# Patient Record
Sex: MALE | Race: WHITE | NOT HISPANIC OR LATINO | URBAN - METROPOLITAN AREA
[De-identification: names, ages, dates, MRNs, and addresses within clinical notes are randomized per-mention and may not be internally consistent; named-entity substitution may affect disease eponyms.]

---

## 2023-12-03 DIAGNOSIS — G56.21 LESION OF RIGHT ULNAR NERVE: ICD-10-CM

## 2023-12-04 PROBLEM — G56.21 LESION OF RIGHT ULNAR NERVE: Status: ACTIVE | Noted: 2023-12-03

## 2023-12-14 ENCOUNTER — ANESTHESIA EVENT (OUTPATIENT)
Dept: OPERATING ROOM | Facility: CLINIC | Age: 48
End: 2023-12-14
Payer: COMMERCIAL

## 2023-12-15 ENCOUNTER — HOSPITAL ENCOUNTER (OUTPATIENT)
Facility: CLINIC | Age: 48
Setting detail: OUTPATIENT SURGERY
Discharge: HOME | End: 2023-12-15
Attending: ORTHOPAEDIC SURGERY | Admitting: ORTHOPAEDIC SURGERY
Payer: COMMERCIAL

## 2023-12-15 ENCOUNTER — ANESTHESIA (OUTPATIENT)
Dept: OPERATING ROOM | Facility: CLINIC | Age: 48
End: 2023-12-15
Payer: COMMERCIAL

## 2023-12-15 VITALS
HEART RATE: 78 BPM | SYSTOLIC BLOOD PRESSURE: 107 MMHG | RESPIRATION RATE: 16 BRPM | OXYGEN SATURATION: 95 % | BODY MASS INDEX: 36.57 KG/M2 | WEIGHT: 246.91 LBS | TEMPERATURE: 97.7 F | HEIGHT: 69 IN | DIASTOLIC BLOOD PRESSURE: 55 MMHG

## 2023-12-15 DIAGNOSIS — G56.21 LESION OF RIGHT ULNAR NERVE: Primary | ICD-10-CM

## 2023-12-15 PROCEDURE — 64718 REVISE ULNAR NERVE AT ELBOW: CPT | Performed by: ORTHOPAEDIC SURGERY

## 2023-12-15 PROCEDURE — 2720000007 HC OR 272 NO HCPCS: Performed by: ORTHOPAEDIC SURGERY

## 2023-12-15 PROCEDURE — 2500000005 HC RX 250 GENERAL PHARMACY W/O HCPCS: Performed by: ORTHOPAEDIC SURGERY

## 2023-12-15 PROCEDURE — 7100000010 HC PHASE TWO TIME - EACH INCREMENTAL 1 MINUTE: Performed by: ORTHOPAEDIC SURGERY

## 2023-12-15 PROCEDURE — 7100000001 HC RECOVERY ROOM TIME - INITIAL BASE CHARGE: Performed by: ORTHOPAEDIC SURGERY

## 2023-12-15 PROCEDURE — 7100000009 HC PHASE TWO TIME - INITIAL BASE CHARGE: Performed by: ORTHOPAEDIC SURGERY

## 2023-12-15 PROCEDURE — 3600000003 HC OR TIME - INITIAL BASE CHARGE - PROCEDURE LEVEL THREE: Performed by: ORTHOPAEDIC SURGERY

## 2023-12-15 PROCEDURE — 3700000002 HC GENERAL ANESTHESIA TIME - EACH INCREMENTAL 1 MINUTE: Performed by: ORTHOPAEDIC SURGERY

## 2023-12-15 PROCEDURE — A64718 PR REVISE ULNAR NERVE AT ELBOW: Performed by: ANESTHESIOLOGY

## 2023-12-15 PROCEDURE — 3700000001 HC GENERAL ANESTHESIA TIME - INITIAL BASE CHARGE: Performed by: ORTHOPAEDIC SURGERY

## 2023-12-15 PROCEDURE — 7100000002 HC RECOVERY ROOM TIME - EACH INCREMENTAL 1 MINUTE: Performed by: ORTHOPAEDIC SURGERY

## 2023-12-15 PROCEDURE — A4217 STERILE WATER/SALINE, 500 ML: HCPCS | Performed by: ORTHOPAEDIC SURGERY

## 2023-12-15 PROCEDURE — A64718 PR REVISE ULNAR NERVE AT ELBOW

## 2023-12-15 PROCEDURE — 2500000004 HC RX 250 GENERAL PHARMACY W/ HCPCS (ALT 636 FOR OP/ED): Performed by: ORTHOPAEDIC SURGERY

## 2023-12-15 PROCEDURE — 2500000004 HC RX 250 GENERAL PHARMACY W/ HCPCS (ALT 636 FOR OP/ED)

## 2023-12-15 PROCEDURE — 2500000005 HC RX 250 GENERAL PHARMACY W/O HCPCS

## 2023-12-15 PROCEDURE — 2500000004 HC RX 250 GENERAL PHARMACY W/ HCPCS (ALT 636 FOR OP/ED): Performed by: STUDENT IN AN ORGANIZED HEALTH CARE EDUCATION/TRAINING PROGRAM

## 2023-12-15 PROCEDURE — 3600000008 HC OR TIME - EACH INCREMENTAL 1 MINUTE - PROCEDURE LEVEL THREE: Performed by: ORTHOPAEDIC SURGERY

## 2023-12-15 RX ORDER — FENTANYL CITRATE 50 UG/ML
50 INJECTION, SOLUTION INTRAMUSCULAR; INTRAVENOUS EVERY 5 MIN PRN
Status: DISCONTINUED | OUTPATIENT
Start: 2023-12-15 | End: 2023-12-15 | Stop reason: HOSPADM

## 2023-12-15 RX ORDER — FENTANYL CITRATE 50 UG/ML
INJECTION, SOLUTION INTRAMUSCULAR; INTRAVENOUS AS NEEDED
Status: DISCONTINUED | OUTPATIENT
Start: 2023-12-15 | End: 2023-12-15

## 2023-12-15 RX ORDER — SODIUM CHLORIDE 0.9 G/100ML
IRRIGANT IRRIGATION AS NEEDED
Status: DISCONTINUED | OUTPATIENT
Start: 2023-12-15 | End: 2023-12-15 | Stop reason: HOSPADM

## 2023-12-15 RX ORDER — PHENYLEPHRINE HCL IN 0.9% NACL 1 MG/10 ML
SYRINGE (ML) INTRAVENOUS AS NEEDED
Status: DISCONTINUED | OUTPATIENT
Start: 2023-12-15 | End: 2023-12-15

## 2023-12-15 RX ORDER — BUPIVACAINE HYDROCHLORIDE 5 MG/ML
INJECTION, SOLUTION PERINEURAL AS NEEDED
Status: DISCONTINUED | OUTPATIENT
Start: 2023-12-15 | End: 2023-12-15 | Stop reason: HOSPADM

## 2023-12-15 RX ORDER — SODIUM CHLORIDE, SODIUM LACTATE, POTASSIUM CHLORIDE, CALCIUM CHLORIDE 600; 310; 30; 20 MG/100ML; MG/100ML; MG/100ML; MG/100ML
100 INJECTION, SOLUTION INTRAVENOUS CONTINUOUS
Status: DISCONTINUED | OUTPATIENT
Start: 2023-12-15 | End: 2023-12-15 | Stop reason: HOSPADM

## 2023-12-15 RX ORDER — HYDRALAZINE HYDROCHLORIDE 20 MG/ML
5 INJECTION INTRAMUSCULAR; INTRAVENOUS EVERY 30 MIN PRN
Status: DISCONTINUED | OUTPATIENT
Start: 2023-12-15 | End: 2023-12-15 | Stop reason: HOSPADM

## 2023-12-15 RX ORDER — CEFAZOLIN 1 G/1
INJECTION, POWDER, FOR SOLUTION INTRAVENOUS AS NEEDED
Status: DISCONTINUED | OUTPATIENT
Start: 2023-12-15 | End: 2023-12-15

## 2023-12-15 RX ORDER — OXYCODONE AND ACETAMINOPHEN 5; 325 MG/1; MG/1
1 TABLET ORAL EVERY 4 HOURS PRN
Status: DISCONTINUED | OUTPATIENT
Start: 2023-12-15 | End: 2023-12-15 | Stop reason: HOSPADM

## 2023-12-15 RX ORDER — ALBUTEROL SULFATE 0.83 MG/ML
2.5 SOLUTION RESPIRATORY (INHALATION) ONCE AS NEEDED
Status: DISCONTINUED | OUTPATIENT
Start: 2023-12-15 | End: 2023-12-15 | Stop reason: HOSPADM

## 2023-12-15 RX ORDER — HYDROCODONE BITARTRATE AND ACETAMINOPHEN 5; 325 MG/1; MG/1
1 TABLET ORAL EVERY 6 HOURS PRN
Qty: 28 TABLET | Refills: 0 | Status: SHIPPED | OUTPATIENT
Start: 2023-12-15 | End: 2023-12-22

## 2023-12-15 RX ORDER — PROPOFOL 10 MG/ML
INJECTION, EMULSION INTRAVENOUS AS NEEDED
Status: DISCONTINUED | OUTPATIENT
Start: 2023-12-15 | End: 2023-12-15

## 2023-12-15 RX ORDER — LIDOCAINE HYDROCHLORIDE 20 MG/ML
INJECTION, SOLUTION INFILTRATION; PERINEURAL AS NEEDED
Status: DISCONTINUED | OUTPATIENT
Start: 2023-12-15 | End: 2023-12-15

## 2023-12-15 RX ORDER — SUCCINYLCHOLINE CHLORIDE 100 MG/5ML
SYRINGE (ML) INTRAVENOUS AS NEEDED
Status: DISCONTINUED | OUTPATIENT
Start: 2023-12-15 | End: 2023-12-15

## 2023-12-15 RX ORDER — ACETAMINOPHEN 325 MG/1
650 TABLET ORAL EVERY 4 HOURS PRN
Status: DISCONTINUED | OUTPATIENT
Start: 2023-12-15 | End: 2023-12-15 | Stop reason: HOSPADM

## 2023-12-15 RX ORDER — MIDAZOLAM HYDROCHLORIDE 1 MG/ML
INJECTION, SOLUTION INTRAMUSCULAR; INTRAVENOUS AS NEEDED
Status: DISCONTINUED | OUTPATIENT
Start: 2023-12-15 | End: 2023-12-15

## 2023-12-15 RX ORDER — DEXAMETHASONE SODIUM PHOSPHATE 100 MG/10ML
INJECTION INTRAMUSCULAR; INTRAVENOUS AS NEEDED
Status: DISCONTINUED | OUTPATIENT
Start: 2023-12-15 | End: 2023-12-15

## 2023-12-15 RX ORDER — CEFAZOLIN SODIUM 2 G/100ML
2 INJECTION, SOLUTION INTRAVENOUS ONCE
Status: CANCELLED | OUTPATIENT
Start: 2023-12-15 | End: 2023-12-15

## 2023-12-15 RX ORDER — ONDANSETRON HYDROCHLORIDE 2 MG/ML
4 INJECTION, SOLUTION INTRAVENOUS ONCE AS NEEDED
Status: DISCONTINUED | OUTPATIENT
Start: 2023-12-15 | End: 2023-12-15 | Stop reason: HOSPADM

## 2023-12-15 RX ORDER — KETOROLAC TROMETHAMINE 30 MG/ML
INJECTION, SOLUTION INTRAMUSCULAR; INTRAVENOUS AS NEEDED
Status: DISCONTINUED | OUTPATIENT
Start: 2023-12-15 | End: 2023-12-15

## 2023-12-15 RX ORDER — ONDANSETRON HYDROCHLORIDE 2 MG/ML
INJECTION, SOLUTION INTRAVENOUS AS NEEDED
Status: DISCONTINUED | OUTPATIENT
Start: 2023-12-15 | End: 2023-12-15

## 2023-12-15 RX ORDER — FENTANYL CITRATE 50 UG/ML
25 INJECTION, SOLUTION INTRAMUSCULAR; INTRAVENOUS EVERY 5 MIN PRN
Status: DISCONTINUED | OUTPATIENT
Start: 2023-12-15 | End: 2023-12-15 | Stop reason: HOSPADM

## 2023-12-15 RX ADMIN — DEXMEDETOMIDINE HYDROCHLORIDE 8 MCG: 100 INJECTION, SOLUTION INTRAVENOUS at 07:36

## 2023-12-15 RX ADMIN — ONDANSETRON 4 MG: 2 INJECTION INTRAMUSCULAR; INTRAVENOUS at 08:17

## 2023-12-15 RX ADMIN — DEXAMETHASONE SODIUM PHOSPHATE 6 MG: 10 INJECTION INTRAMUSCULAR; INTRAVENOUS at 07:43

## 2023-12-15 RX ADMIN — DEXMEDETOMIDINE HYDROCHLORIDE 8 MCG: 100 INJECTION, SOLUTION INTRAVENOUS at 08:29

## 2023-12-15 RX ADMIN — LIDOCAINE HYDROCHLORIDE 50 MG: 20 INJECTION, SOLUTION INFILTRATION; PERINEURAL at 07:35

## 2023-12-15 RX ADMIN — PROPOFOL 100 MG: 10 INJECTION, EMULSION INTRAVENOUS at 07:40

## 2023-12-15 RX ADMIN — PROPOFOL 100 MG: 10 INJECTION, EMULSION INTRAVENOUS at 07:38

## 2023-12-15 RX ADMIN — Medication 20 MG: at 07:40

## 2023-12-15 RX ADMIN — PROPOFOL 200 MG: 10 INJECTION, EMULSION INTRAVENOUS at 07:36

## 2023-12-15 RX ADMIN — Medication 120 MCG: at 08:24

## 2023-12-15 RX ADMIN — FENTANYL CITRATE 50 MCG: 50 INJECTION, SOLUTION INTRAMUSCULAR; INTRAVENOUS at 07:56

## 2023-12-15 RX ADMIN — Medication 120 MCG: at 08:34

## 2023-12-15 RX ADMIN — FENTANYL CITRATE 50 MCG: 50 INJECTION, SOLUTION INTRAMUSCULAR; INTRAVENOUS at 07:36

## 2023-12-15 RX ADMIN — MIDAZOLAM 2 MG: 1 INJECTION INTRAMUSCULAR; INTRAVENOUS at 07:30

## 2023-12-15 RX ADMIN — SODIUM CHLORIDE, POTASSIUM CHLORIDE, SODIUM LACTATE AND CALCIUM CHLORIDE 100 ML/HR: 600; 310; 30; 20 INJECTION, SOLUTION INTRAVENOUS at 06:57

## 2023-12-15 RX ADMIN — Medication 80 MCG: at 08:08

## 2023-12-15 RX ADMIN — CEFAZOLIN 2 G: 1 INJECTION, POWDER, FOR SOLUTION INTRAMUSCULAR; INTRAVENOUS at 07:45

## 2023-12-15 RX ADMIN — KETOROLAC TROMETHAMINE 30 MG: 30 INJECTION, SOLUTION INTRAMUSCULAR at 08:41

## 2023-12-15 SDOH — HEALTH STABILITY: MENTAL HEALTH: CURRENT SMOKER: 0

## 2023-12-15 ASSESSMENT — PAIN SCALES - GENERAL
PAINLEVEL_OUTOF10: 0 - NO PAIN
PAIN_LEVEL: 0
PAINLEVEL_OUTOF10: 0 - NO PAIN

## 2023-12-15 ASSESSMENT — COLUMBIA-SUICIDE SEVERITY RATING SCALE - C-SSRS
2. HAVE YOU ACTUALLY HAD ANY THOUGHTS OF KILLING YOURSELF?: NO
1. IN THE PAST MONTH, HAVE YOU WISHED YOU WERE DEAD OR WISHED YOU COULD GO TO SLEEP AND NOT WAKE UP?: NO
6. HAVE YOU EVER DONE ANYTHING, STARTED TO DO ANYTHING, OR PREPARED TO DO ANYTHING TO END YOUR LIFE?: NO

## 2023-12-15 ASSESSMENT — PAIN - FUNCTIONAL ASSESSMENT
PAIN_FUNCTIONAL_ASSESSMENT: 0-10

## 2023-12-15 NOTE — ANESTHESIA PROCEDURE NOTES
Airway  Date/Time: 12/15/2023 7:40 AM  Urgency: elective    Airway not difficult    Staffing  Performed: CRNA   Authorized by: Claritza Silva MD    Performed by: LAZ Chapin  Patient location during procedure: OR    Indications and Patient Condition  Indications for airway management: anesthesia and airway protection  Spontaneous ventilation: present  Sedation level: deep  Preoxygenated: yes  Patient position: sniffing  MILS maintained throughout      Final Airway Details  Final airway type: supraglottic airway      Successful airway: Size 4     Number of attempts at approach: 1

## 2023-12-15 NOTE — ANESTHESIA POSTPROCEDURE EVALUATION
Patient: Elie Ovalle    Procedure Summary       Date: 12/15/23 Room / Location: [facility] [place] [facility] 02 / Virtual [facility] [place] [facility]    Anesthesia Start: 0730 Anesthesia Stop: 0902    Procedure: Right Ulnar Nerve Decompression With Anterior Transposition / 60 Minutes (Right: Hand) Diagnosis:       Lesion of right ulnar nerve      (Lesion of right ulnar nerve [G56.21])    Surgeons: Elie Coates MD Responsible Provider: Claritza Silva MD    Anesthesia Type: general ASA Status: 2            Anesthesia Type: general    Vitals Value Taken Time   /85 12/15/23 0904   Temp 36 12/15/23 0904   Pulse 97 12/15/23 0904   Resp 12 12/15/23 0904   SpO2 97 12/15/23 0904       Anesthesia Post Evaluation    Patient location during evaluation: bedside  Patient participation: complete - patient participated  Level of consciousness: awake and alert  Pain score: 0  Pain management: adequate  Airway patency: patent  Cardiovascular status: hemodynamically stable and acceptable  Respiratory status: acceptable  Hydration status: acceptable  Postoperative Nausea and Vomiting: none        No notable events documented.

## 2023-12-15 NOTE — ANESTHESIA PREPROCEDURE EVALUATION
"Patient: Elie Ovalle    Procedure Information       Date/Time: 12/15/23 0730    Procedure: Right Ulnar Nerve Decompression With Possible Anterior Transposition / 60 Minutes (Right: Hand)    Location: [facility] [place] [facility] OR 02 / Virtual [facility] [place] [facility] OR    Surgeons: Elie Coates MD          Vitals:    12/15/23 0658   BP: 161/90   Pulse: 77   Resp: 16   Temp: 37.2 °C (99 °F)   SpO2: 97%       Past Surgical History:   Procedure Laterality Date   • UMBILICAL HERNIA REPAIR     • WISDOM TOOTH EXTRACTION       Past Medical History:   Diagnosis Date   • Arthritis     right elbow   • Cubital tunnel syndrome on right    • Nasal septum perforation    • Staphylococcus aureus infection, multiple-resistant (MRSA)     several years ago   • Substance addiction (CMS/[facility])        Current Facility-Administered Medications:   •  lactated Ringer's infusion, 100 mL/hr, intravenous, Continuous, Heidy Schofield MD, Last Rate: 100 mL/hr at 12/15/23 0657, 100 mL/hr at 12/15/23 0657  Prior to Admission medications    Medication Sig Start Date End Date Taking? Authorizing Provider   HYDROcodone-acetaminophen (Norco) 5-325 mg tablet Take 1 tablet by mouth every 6 hours if needed for severe pain (7 - 10) for up to 7 days. 12/15/23 12/22/23  Elie Coates MD     No Known Allergies  Social History     Tobacco Use   • Smoking status: Never   • Smokeless tobacco: Never   • Tobacco comments:     No history of anesthesia reactions. No family history of MH.     No illnesses in the past 30 days.     Does not get SOB with a flight of stairs.     Does not use a cane, walker, or wheelchair.     Is able to lie flat for 30 minutes.    Substance Use Topics   • Alcohol use: Yes     Comment: occasional beer         Chemistry    No results found for: \"NA\", \"K\", \"CL\", \"CO2\", \"BUN\", \"CREATININE\", \"GLU\" No results found for: \"CALCIUM\", \"ALKPHOS\", \"AST\", \"ALT\", \"BILITOT\"       No results found for: \"WBC\", \"HGB\", \"HCT\", \"PLT\"  No results found for: \"PROTIME\", \"PTT\", " "\"INR\"  No results found for this or any previous visit (from the past 4464 hour(s)).  No results found for this or any previous visit from the past 1095 days.      Relevant Problems   Anesthesia (within normal limits)      Endocrine  Anabolic steroid use on cycles in the past.       Neuro/Psych   (+) Lesion of right ulnar nerve       Clinical information reviewed:   Tobacco  Allergies  Meds   Med Hx  Surg Hx   Fam Hx  Soc Hx        NPO Detail:  NPO/Void Status  Date of Last Liquid: 12/14/23  Time of Last Liquid: 2030  Date of Last Solid: 12/14/23  Time of Last Solid: 2030  Last Intake Type: Clear fluids; Food         Physical Exam    Airway  Mallampati: III  TM distance: >3 FB  Neck ROM: full     Cardiovascular - normal exam     Dental   Comments: Partial dentures, removed already with no other dental issues.    Pulmonary - normal exam     Abdominal - normal exam           Anesthesia Plan    ASA 2     general   (LMA)  The patient is not a current smoker.    intravenous induction   Anesthetic plan and risks discussed with patient and spouse.  Use of blood products discussed with patient and spouse who consented to blood products.    Plan discussed with CRNA and attending.    "

## 2023-12-15 NOTE — OP NOTE
Right Ulnar Nerve Decompression With Possible Anterior Transposition / 60 Minutes (R) Operative Note     Date: 12/15/2023  OR Location: [facility] OR    Name: Elie Ovalle, : 1975, Age: 48 y.o., MRN: [id number], Sex: male    Diagnosis  Pre-op Diagnosis     * Lesion of right ulnar nerve [G56.21] Post-op Diagnosis     * Lesion of right ulnar nerve [G56.21]     Procedures  Right Ulnar Nerve Decompression With Possible Anterior Transposition / 60 Minutes  25881 - [place] NEUROPLASTY &/TRANSPOSITION ULNAR NERVE ELBOW      Surgeons      * Elie NIMISHA Coates - Primary    Resident/Fellow/Other Assistant:  Surgeon(s) and Role: Christiano Llanes MD    Procedure Summary  Anesthesia: General  ASA: II  Anesthesia Staff: Anesthesiologist: Claritza Silva MD  CRNA: MOLLY Chaipn-CRNA  Estimated Blood Loss: 2 mL  Intra-op Medications:   Medication Name Total Dose   sodium chloride 0.9 % irrigation solution 250 mL   BUPivacaine HCl (Marcaine) 0.5 % (5 mg/mL) injection 20 mL              Anesthesia Record               Intraprocedure I/O Totals          Intake    Dexmedetomidine 0.00 mL    The total shown is the total volume documented since Anesthesia Start was filed.    Total Intake 0 mL          Specimen: No specimens collected     Staff:   Circulator: Zaira Joya RN  Scrub Person: Román Shaver         Drains and/or Catheters: * None in log *    Tourniquet Times:     Total Tourniquet Time Documented:  Arm - Upper (Right) - 48 minutes  Total: Arm - Upper (Right) - 48 minutes      Implants:     Findings: Chronic right upper extremity ulnar neuropathy with severe arthritic elbow contracture    Indications: Elie Ovalle is an 48 y.o. male who is having surgery for Lesion of right ulnar nerve [G56.21].      The patient was seen in the preoperative area. The risks, benefits, complications, treatment options, non-operative alternatives, expected recovery and outcomes were discussed with the patient. The possibilities of  reaction to medication, pulmonary aspiration, injury to surrounding structures, bleeding, recurrent infection, the need for additional procedures, failure to diagnose a condition, and creating a complication requiring transfusion or operation were discussed with the patient. The patient concurred with the proposed plan, giving informed consent.  The site of surgery was properly noted/marked if necessary per policy. The patient has been actively warmed in preoperative area. Preoperative antibiotics have been ordered and given within 1 hours of incision. Venous thrombosis prophylaxis have been ordered including bilateral sequential compression devices    Procedure Details:   48-year-old right-hand-dominant gentleman with a history of heavy bodybuilding and use of anabolic steroids and longstanding right elbow arthritis with joint motion limitations and severe chronic ulnar neuropathy with intrinsic muscle wasting and atrophy.  Presents today for ulnar nerve decompression with possible anterior transposition.  In the outpatient setting we have discussed potential other treatment options including contracture release to try to improve his elbow range of motion and tendon transfers to improve hand function and posture.  He has declined these more complicated procedures.  He presents today for his ulnar nerve decompression.  He is aware that recovery and improvement of ulnar nerve function is going to be very hard to predict and that the goal of today surgery is to prevent further neurologic deterioration.    Preoperatively the right arm was identified and marked for surgery.  Informed consent process was completed.    Patient was brought to the operating placed supine on the operating table.  A timeout procedure was performed to verify the correct patient procedure and operative site.  Intravenous antibiotics were administered.  General anesthetic was initiated by the anesthesia service.  Right upper extremity was  prepped and draped in usual sterile fashion.  Limb was exsanguinated and a tourniquet was inflated to 200 mmHg.    We inspected his elbow.  His range of motion was from about 30 degrees to 90 degrees in the sagittal plane.  He had full forearm rotation.  The medial epicondyle was palpable.  We made an incision that was about 3 inches long along the posterior aspect of the medial epicondyle over the ulnar nerve.  We dissected through the subcutaneous tissue.  Deep retractors were placed.  Cutaneous nerve branches were identified mobilized and protected.  We identified the medial triceps.  We identified the ulnar nerve anterior to the medial triceps which had already essentially subluxed from the cubital tunnel such that at the superior portion of the cubital tunnel the nerve was sitting directly medial to the medial epicondyle and then had to talk deep quickly underneath the FCU fascia which caused significant kinking.  Based on this finding we extended the incision proximally and distally with anticipation of an anterior transposition.  Full-thickness fasciocutaneous flaps were elevated.  An anterior subcutaneous pocket was created.  We then began to carefully decompress the nerve releasing it from the fascial structures containing it along the medial triceps.  The remaining portion of Thibodeaux's fascia was released.  The FCU fascia was incised.  We dissected between the 2 heads of the FCU muscle bellies noticing that the muscle bellies themselves had a chronic atrophy denervation appearance with a pale color appearance.  We released the deep FCU fascia to allow further nerve mobilization.  The ulnar and humeral FCU motor branches were identified and protected.  The medial intermuscular septum was excised and then we began to carefully move the nerve into its new anterior subcutaneous position.  Once the nerve was placed in this position it was very stable there because he had a fairly significant flexion contracture  which essentially prevented the nerve from falling back into the retrocondylar groove.  However, 2 ensure that there is no displacement of the ulnar nerve into the retrocondylar groove we elevated a fascial sling from the common flexor pronator origin.  This was sutured to the anterior adequate fascial flap to contain the nerve anteriorly.  The wound was copiously irrigated.  We did identify a visible and palpable change in the contour and texture of the ulnar nerve corresponding with the site of maximum compression and kinking at the cubital tunnel.  The wound was closed interrupted fashion.  Local anesthetic infiltrated around the wound margins.  Sterile bandages were applied.  The tourniquet was deflated.  The patient was awoken from his anesthetic and transferred to recovery in stable condition.    Postoperatively he will be discharged home once comfortable.  He will remove his bandage on postop day #5 and begin wound care with light activities as tolerated.  Return to clinic in 2 weeks for wound check.  We will recommend that he avoid return to heavy resisted activities for at least 6 weeks to allow for adequate soft tissue healing.        Complications:  None; patient tolerated the procedure well.    Disposition: PACU - hemodynamically stable.  Condition: stable         Additional Details:     Attending Attestation: I was present and scrubbed for the entire procedure.    Elie Coates  Phone Number: [phone]

## 2023-12-15 NOTE — H&P
"History Of Present Illness  Elie Ovalle is a 48 y.o. male presenting with right elbow ulnar neuropathy with advanced elbow arthritis.     Past Medical History  Past Medical History:   Diagnosis Date    Arthritis     right elbow    Cubital tunnel syndrome on right     Nasal septum perforation     Staphylococcus aureus infection, multiple-resistant (MRSA)     several years ago    Substance addiction (CMS/[facility])        Surgical History  Past Surgical History:   Procedure Laterality Date    UMBILICAL HERNIA REPAIR      WISDOM TOOTH EXTRACTION          Social History  He reports that he has never smoked. He has never used smokeless tobacco. He reports current alcohol use. He reports current drug use. Drug: Anabolic steroids.    Family History  No family history on file.     Allergies  Patient has no known allergies.    Review of Systems   All other systems reviewed and are negative.       Physical Exam  Physical Examination Findings:  Constitutional: Appears well-developed and well-nourished.  Head: Normocephalic and atraumatic.  Eyes: Pupils are equal and round.  Cardiovascular: Intact distal pulses.   Respiratory: Effort normal. No respiratory distress.  Neurologic: Alert and oriented to person, place, and time.  Skin: Skin is warm and dry.  Hematologic / Lympahtic: No lymphedema, lymphangitis.  Psychiatric: normal mood and affect. Behavior is normal.   Musculoskeletal: Right upper extremity with elbow contracture with loss of terminal flexion and extension.  Pain and tenderness along the medial border.  Numbness in the ring and small finger.  Atrophy of the ulnar nerve innervated muscles right hand.     Last Recorded Vitals  Blood pressure 161/90, pulse 77, temperature 37.2 °C (99 °F), temperature source Temporal, resp. rate 16, height 1.753 m (5' 9\"), weight 112 kg (246 lb 14.6 oz), SpO2 97 %.      Assessment/Plan   Principal Problem:    Lesion of right ulnar nerve      Will proceed with right elbow ulnar nerve " decompression with possible anterior transposition as scheduled.             Elie Coates MD

## 2024-01-02 ENCOUNTER — OFFICE VISIT (OUTPATIENT)
Dept: ORTHOPEDIC SURGERY | Facility: CLINIC | Age: 49
End: 2024-01-02
Payer: COMMERCIAL

## 2024-01-02 VITALS — HEIGHT: 69 IN | BODY MASS INDEX: 36.43 KG/M2 | WEIGHT: 246 LBS

## 2024-01-02 DIAGNOSIS — G56.21 LESION OF RIGHT ULNAR NERVE: Primary | ICD-10-CM

## 2024-01-02 PROCEDURE — 99024 POSTOP FOLLOW-UP VISIT: CPT | Performed by: ORTHOPAEDIC SURGERY

## 2024-01-02 PROCEDURE — 1036F TOBACCO NON-USER: CPT | Performed by: ORTHOPAEDIC SURGERY

## 2024-01-02 ASSESSMENT — PAIN - FUNCTIONAL ASSESSMENT: PAIN_FUNCTIONAL_ASSESSMENT: NO/DENIES PAIN

## 2024-01-02 NOTE — PROGRESS NOTES
First postoperative visit after right elbow ulnar nerve decompression with anterior subcutaneous transposition.  Patient indicates that he has had no pain following surgery.  Date of surgery was December 15, 2023.    Examination reveals well-healed surgical incision.  Mild medial elbow swelling and resolving ecchymosis.  Full elbow flexion maintained.  Chronic elbow flexion contracture unchanged.  Chronic changes of ulnar nerve innervated muscle atrophy of the hand with clawing of the ring and small finger.  Diminished sensation ulnar nerve distribution.    Impression: Chronic ulnar neuropathy right elbow.    Plan: Recommendations at this time are to progress with light range of motion activities to facilitate nerve gliding.  He was advised against heavier lifting, weightbearing or other resisted activities.  He is cleared out of work until March 1.  He will return to see me beginning of February for repeat clinical examination to make sure he is on track for his return to work in early March.    Elie Coates MD    [employer]  Department of Orthopaedic Surgery  Chief of Hand and Upper Extremity Surgery  [facility]    Dictation performed with the use of voice recognition software. Syntax and grammatical errors may exist.

## 2024-02-05 ENCOUNTER — APPOINTMENT (OUTPATIENT)
Dept: ORTHOPEDIC SURGERY | Facility: CLINIC | Age: 49
End: 2024-02-05
Payer: COMMERCIAL

## (undated) DEVICE — Device

## (undated) DEVICE — SYRINGE, 20 CC, LUER LOCK

## (undated) DEVICE — SOLUTION, IRRIGATION, SODIUM CHLORIDE 0.9%, 1000 ML, POUR BOTTLE

## (undated) DEVICE — LOOP, VESSEL, MAXI, RED

## (undated) DEVICE — BANDAGE, ELASTIC, REINFORCED, ECONO-WRAP, 4 IN X 4.5 YD, LATEX

## (undated) DEVICE — STRIP, SKIN CLOSURE, STERI STRIP, NON-REINFORCED, 0.5 X 4 IN

## (undated) DEVICE — SUTURE, MONOCRYLIC, 4-0, P3, MONO 18

## (undated) DEVICE — SUTURE, VICRYL, 3-0, 27 IN, RB-1, VIOLET

## (undated) DEVICE — DRESSING, GAUZE, SPONGE, 12 PLY, 4 X 4 IN, PLASTIC POUCH, STRL 10PK

## (undated) DEVICE — CUFF, TOURNIQUET, 18 X 4, DUAL PORT/SNGL BLADDER, DISP, LF

## (undated) DEVICE — TOWEL, SURGICAL, NEURO, O/R, 16 X 26, BLUE, STERILE

## (undated) DEVICE — PADDING, WEBRIL, UNDERCAST, STERILE, 4 IN

## (undated) DEVICE — BANDAGE, ELASTIC, COMPRESSION, W/REMOVABLE CLIP, 3 IN X 4.5 YD